# Patient Record
Sex: FEMALE | Race: BLACK OR AFRICAN AMERICAN | NOT HISPANIC OR LATINO | Employment: OTHER | URBAN - METROPOLITAN AREA
[De-identification: names, ages, dates, MRNs, and addresses within clinical notes are randomized per-mention and may not be internally consistent; named-entity substitution may affect disease eponyms.]

---

## 2019-10-11 PROBLEM — E11.9 DIABETES MELLITUS WITHOUT COMPLICATION: Status: ACTIVE | Noted: 2019-10-11

## 2019-10-11 PROBLEM — H25.819 COMBINED FORMS OF AGE-RELATED CATARACT: Status: ACTIVE | Noted: 2019-10-11

## 2019-10-16 PROBLEM — Z95.828 STATUS POST PLACEMENT OF ARTERIOVENOUS GRAFT: Status: ACTIVE | Noted: 2019-10-16

## 2019-10-16 PROBLEM — T82.9XXA COMPLICATIONS DUE TO RENAL DIALYSIS DEVICE, IMPLANT, AND GRAFT: Status: ACTIVE | Noted: 2019-10-16

## 2019-12-19 PROBLEM — T82.7XXA DIALYSIS AV FISTULA INFECTION: Status: ACTIVE | Noted: 2019-12-19

## 2019-12-23 PROBLEM — E11.9 DIABETES MELLITUS WITHOUT COMPLICATION: Status: RESOLVED | Noted: 2019-10-11 | Resolved: 2019-12-23

## 2020-01-27 PROBLEM — T14.8XXA WOUND INFECTION: Chronic | Status: ACTIVE | Noted: 2020-01-27

## 2020-01-27 PROBLEM — L08.9 WOUND INFECTION: Chronic | Status: ACTIVE | Noted: 2020-01-27

## 2020-01-27 PROBLEM — T82.7XXA ARTERIOVENOUS GRAFT INFECTION: Status: ACTIVE | Noted: 2020-01-27

## 2020-01-30 PROBLEM — L02.419 AXILLARY ABSCESS: Status: ACTIVE | Noted: 2020-01-30

## 2020-02-01 PROBLEM — T82.7XXA ARTERIOVENOUS GRAFT INFECTION: Status: ACTIVE | Noted: 2019-09-18

## 2020-02-14 PROBLEM — E87.20 LACTIC ACIDOSIS: Status: ACTIVE | Noted: 2020-02-14

## 2020-02-15 PROBLEM — A41.9 SEPSIS: Status: ACTIVE | Noted: 2020-01-27

## 2020-02-17 PROBLEM — J18.9 PNEUMONIA: Status: ACTIVE | Noted: 2020-02-17

## 2020-03-30 PROBLEM — L02.419 AXILLARY ABSCESS: Status: RESOLVED | Noted: 2020-01-30 | Resolved: 2020-03-30

## 2020-03-30 PROBLEM — K92.2 UGIB (UPPER GASTROINTESTINAL BLEED): Status: ACTIVE | Noted: 2020-03-30

## 2020-03-30 PROBLEM — K92.0 COFFEE GROUND EMESIS: Status: ACTIVE | Noted: 2020-03-30

## 2020-03-30 PROBLEM — J18.9 PNEUMONIA: Status: RESOLVED | Noted: 2020-02-17 | Resolved: 2020-03-30

## 2020-03-30 PROBLEM — T82.7XXA DIALYSIS AV FISTULA INFECTION: Status: RESOLVED | Noted: 2019-12-19 | Resolved: 2020-03-30

## 2020-03-30 PROBLEM — A41.9 SEPSIS: Status: RESOLVED | Noted: 2020-01-27 | Resolved: 2020-03-30

## 2020-03-30 PROBLEM — E87.20 LACTIC ACIDOSIS: Status: RESOLVED | Noted: 2020-02-14 | Resolved: 2020-03-30

## 2020-03-30 PROBLEM — T82.7XXA ARTERIOVENOUS GRAFT INFECTION: Status: RESOLVED | Noted: 2019-09-18 | Resolved: 2020-03-30

## 2020-05-07 PROBLEM — G45.9 TIA (TRANSIENT ISCHEMIC ATTACK): Status: ACTIVE | Noted: 2020-05-07

## 2020-05-07 PROBLEM — E11.9 T2DM (TYPE 2 DIABETES MELLITUS): Status: ACTIVE | Noted: 2020-05-07

## 2020-05-07 PROBLEM — N18.6 ESRD (END STAGE RENAL DISEASE): Status: ACTIVE | Noted: 2020-05-07

## 2020-05-07 PROBLEM — I63.9 CVA (CEREBRAL VASCULAR ACCIDENT): Status: ACTIVE | Noted: 2020-05-07

## 2020-05-07 PROBLEM — I10 ESSENTIAL HYPERTENSION: Status: ACTIVE | Noted: 2020-05-07

## 2020-05-07 PROBLEM — I50.9 CHF (CONGESTIVE HEART FAILURE): Status: ACTIVE | Noted: 2020-05-07

## 2020-05-08 PROBLEM — R55 POSTURAL DIZZINESS WITH PRESYNCOPE: Status: ACTIVE | Noted: 2020-05-08

## 2020-05-08 PROBLEM — R42 POSTURAL DIZZINESS WITH PRESYNCOPE: Status: ACTIVE | Noted: 2020-05-08

## 2020-05-09 PROBLEM — I95.1 ORTHOSTATIC HYPOTENSION: Status: ACTIVE | Noted: 2020-05-09

## 2020-05-09 PROBLEM — R42 POSTURAL DIZZINESS WITH PRESYNCOPE: Status: RESOLVED | Noted: 2020-05-08 | Resolved: 2020-05-09

## 2020-05-09 PROBLEM — R55 POSTURAL DIZZINESS WITH PRESYNCOPE: Status: RESOLVED | Noted: 2020-05-08 | Resolved: 2020-05-09

## 2020-05-19 PROBLEM — R11.2 INTRACTABLE NAUSEA AND VOMITING: Status: ACTIVE | Noted: 2020-05-19

## 2020-05-19 PROBLEM — T82.898A PROBLEM WITH DIALYSIS ACCESS: Status: ACTIVE | Noted: 2020-05-19

## 2020-05-19 PROBLEM — A49.8 CLOSTRIDIUM DIFFICILE INFECTION: Status: ACTIVE | Noted: 2020-05-19

## 2020-05-19 PROBLEM — R11.10 INTRACTABLE VOMITING: Status: ACTIVE | Noted: 2020-05-19

## 2020-05-21 PROBLEM — A49.8 CLOSTRIDIUM DIFFICILE INFECTION: Status: ACTIVE | Noted: 2020-05-21

## 2020-05-21 PROBLEM — A04.72 C. DIFFICILE DIARRHEA: Status: ACTIVE | Noted: 2020-05-21

## 2020-05-22 PROBLEM — R11.2 INTRACTABLE NAUSEA AND VOMITING: Status: RESOLVED | Noted: 2020-05-19 | Resolved: 2020-05-22

## 2020-05-26 PROBLEM — K21.00 GASTROESOPHAGEAL REFLUX DISEASE WITH ESOPHAGITIS: Status: ACTIVE | Noted: 2020-05-26

## 2020-05-26 PROBLEM — I95.9 HYPOTENSION: Status: ACTIVE | Noted: 2020-05-26

## 2020-05-26 PROBLEM — E11.9 TYPE 2 DIABETES MELLITUS, WITHOUT LONG-TERM CURRENT USE OF INSULIN: Status: ACTIVE | Noted: 2020-05-26

## 2020-05-26 PROBLEM — R55 SYNCOPE: Status: ACTIVE | Noted: 2020-05-26

## 2020-05-26 PROBLEM — R94.31 PROLONGED Q-T INTERVAL ON ECG: Status: ACTIVE | Noted: 2020-05-26

## 2020-05-27 PROBLEM — A49.8 CLOSTRIDIUM DIFFICILE INFECTION: Status: ACTIVE | Noted: 2020-05-27

## 2020-05-27 PROBLEM — I95.1 AUTONOMIC POSTURAL HYPOTENSION: Status: ACTIVE | Noted: 2020-05-26

## 2020-05-28 PROBLEM — K20.90 ESOPHAGITIS DETERMINED BY ENDOSCOPY: Status: ACTIVE | Noted: 2020-05-26

## 2020-06-10 ENCOUNTER — NURSE TRIAGE (OUTPATIENT)
Dept: ADMINISTRATIVE | Facility: CLINIC | Age: 70
End: 2020-06-10

## 2020-06-10 NOTE — TELEPHONE ENCOUNTER
Pt contacted through the Post Procedural Symptom Tracker. No answer. No additional contact today as per post procedure protocol.

## 2020-06-10 NOTE — TELEPHONE ENCOUNTER
Reason for Disposition   No answer.  First attempt to contact caller.  Follow-up call scheduled within 15 minutes.     Day 13: only one attempt per post proc protocol.    Additional Information   Negative: Caller has already spoken with the PCP (or office), and has no further questions   Negative: Caller has already spoken with another triager and has no further questions   Negative: Caller has already spoken with another triager or PCP (or office), and has further questions and triager able to answer questions.   Negative: Busy signal.  First attempt to contact caller.  Follow-up call scheduled within 15 minutes.    Protocols used: NO CONTACT OR DUPLICATE CONTACT CALL-A-[facility]

## 2020-06-11 NOTE — TELEPHONE ENCOUNTER
Pt contacted through the Post Procedural Symptom Tracker. No answer. No additional contact today as per post procedure protocol.  dy 14   [id number]      Reason for Disposition   Second attempt to contact family AND no contact made. Phone number verified.    Protocols used: NO CONTACT OR DUPLICATE CONTACT CALL-A-[facility]

## 2020-06-13 PROBLEM — K92.0 COFFEE GROUND EMESIS: Status: RESOLVED | Noted: 2020-03-30 | Resolved: 2020-06-13

## 2020-06-13 PROBLEM — R91.8 PULMONARY INFILTRATES ON CXR: Status: ACTIVE | Noted: 2020-06-13

## 2020-06-13 PROBLEM — I95.9 HYPOTENSION: Status: ACTIVE | Noted: 2020-06-13

## 2020-06-15 PROBLEM — R53.81 PHYSICAL DECONDITIONING: Status: ACTIVE | Noted: 2020-06-15

## 2020-06-15 PROBLEM — E46 PROTEIN CALORIE MALNUTRITION: Status: ACTIVE | Noted: 2020-06-15

## 2020-06-29 ENCOUNTER — NURSE TRIAGE (OUTPATIENT)
Dept: ADMINISTRATIVE | Facility: CLINIC | Age: 70
End: 2020-06-29

## 2020-06-29 NOTE — TELEPHONE ENCOUNTER
Patient is in rehab center. No post procedure call needed.     Reason for Disposition   Patient already left for the hospital/clinic    Protocols used: NO CONTACT OR DUPLICATE CONTACT CALL-A-[facility]

## 2020-07-16 PROBLEM — K20.80 ESOPHAGITIS, LOS ANGELES GRADE D: Status: ACTIVE | Noted: 2020-07-16

## 2020-07-16 PROBLEM — A49.8 CLOSTRIDIUM DIFFICILE INFECTION: Status: RESOLVED | Noted: 2020-05-27 | Resolved: 2020-07-16

## 2020-07-17 PROBLEM — K22.2 ESOPHAGEAL STRICTURE: Status: ACTIVE | Noted: 2020-07-17

## 2020-07-20 PROBLEM — K20.80 ESOPHAGITIS, LOS ANGELES GRADE D: Status: RESOLVED | Noted: 2020-07-16 | Resolved: 2020-07-20

## 2020-07-21 PROBLEM — Z93.1 G TUBE FEEDINGS: Status: ACTIVE | Noted: 2020-07-21

## 2020-07-29 ENCOUNTER — NURSE TRIAGE (OUTPATIENT)
Dept: ADMINISTRATIVE | Facility: CLINIC | Age: 70
End: 2020-07-29

## 2020-07-29 NOTE — TELEPHONE ENCOUNTER
Post procedure follow up call scheduled for today, no contact made. Patient is a resident at a nursing home. No need for further triage at this time.     Reason for Disposition   Caller has already spoken with the PCP (or office), and has no further questions    Protocols used: NO CONTACT OR DUPLICATE CONTACT CALL-A-[facility]

## 2020-10-08 PROBLEM — R41.82 ALTERED MENTAL STATUS: Status: ACTIVE | Noted: 2020-10-08

## 2020-10-08 PROBLEM — K21.9 GERD (GASTROESOPHAGEAL REFLUX DISEASE): Status: ACTIVE | Noted: 2020-10-08

## 2020-10-08 PROBLEM — F05 ACUTE CONFUSIONAL STATE: Status: ACTIVE | Noted: 2020-10-08

## 2020-10-08 PROBLEM — E11.9 DIABETES MELLITUS: Status: ACTIVE | Noted: 2020-10-08

## 2020-10-08 PROBLEM — E16.2 HYPOGLYCEMIA, UNSPECIFIED: Status: ACTIVE | Noted: 2020-10-08

## 2020-10-08 PROBLEM — I25.10 CAD (CORONARY ARTERY DISEASE): Status: ACTIVE | Noted: 2020-10-08

## 2020-10-08 PROBLEM — G93.40 ACUTE ENCEPHALOPATHY: Status: ACTIVE | Noted: 2020-10-08

## 2020-10-09 PROBLEM — R41.82 ALTERED MENTAL STATUS: Status: ACTIVE | Noted: 2020-10-09

## 2020-10-09 PROBLEM — R41.82 ALTERED MENTAL STATUS: Status: RESOLVED | Noted: 2020-10-08 | Resolved: 2020-10-09

## 2020-10-09 PROBLEM — G93.40 ACUTE ENCEPHALOPATHY: Status: RESOLVED | Noted: 2020-10-08 | Resolved: 2020-10-09

## 2020-10-09 PROBLEM — E16.2 HYPOGLYCEMIA: Status: RESOLVED | Noted: 2020-10-08 | Resolved: 2020-10-09

## 2020-10-09 PROBLEM — I10 ESSENTIAL HYPERTENSION: Status: RESOLVED | Noted: 2020-05-07 | Resolved: 2020-10-09

## 2020-10-09 PROBLEM — F05 ACUTE CONFUSIONAL STATE: Status: RESOLVED | Noted: 2020-10-08 | Resolved: 2020-10-09

## 2020-10-10 PROBLEM — E87.3 METABOLIC ALKALOSIS: Status: ACTIVE | Noted: 2020-10-10

## 2020-10-11 PROBLEM — R41.82 ALTERED MENTAL STATUS: Status: RESOLVED | Noted: 2020-10-09 | Resolved: 2020-10-11

## 2020-10-12 PROBLEM — T74.91XA ELDER ABUSE: Status: ACTIVE | Noted: 2020-10-12

## 2020-10-12 PROBLEM — F32.A DEPRESSION: Status: ACTIVE | Noted: 2020-10-12

## 2020-10-12 PROBLEM — F43.29 ADJUSTMENT DISORDER WITH EMOTIONAL DISTURBANCE: Status: ACTIVE | Noted: 2020-10-12

## 2020-10-12 PROBLEM — A04.72 C. DIFFICILE DIARRHEA: Status: RESOLVED | Noted: 2020-05-21 | Resolved: 2020-10-12

## 2020-10-15 PROBLEM — I50.9 HEART FAILURE: Status: RESOLVED | Noted: 2020-05-07 | Resolved: 2020-10-15

## 2021-04-11 PROBLEM — Z99.2 ESRD ON HEMODIALYSIS: Status: ACTIVE | Noted: 2020-05-07

## 2021-04-11 PROBLEM — Z91.89 AT RISK FOR ELDER ABUSE: Status: ACTIVE | Noted: 2021-01-01

## 2021-04-11 PROBLEM — E43 SEVERE MALNUTRITION: Status: ACTIVE | Noted: 2021-01-01

## 2021-04-11 PROBLEM — J90 PLEURAL EFFUSION: Status: ACTIVE | Noted: 2021-01-01

## 2021-04-11 PROBLEM — R13.10 DYSPHAGIA: Status: ACTIVE | Noted: 2021-01-01

## 2021-04-11 PROBLEM — R64 CACHEXIA: Status: ACTIVE | Noted: 2021-01-01

## 2021-04-12 PROBLEM — T82.7XXA INFECTION OF AV GRAFT FOR DIALYSIS: Chronic | Status: ACTIVE | Noted: 2021-01-01

## 2021-04-13 PROBLEM — I95.9 HYPOTENSION: Status: ACTIVE | Noted: 2021-01-01

## 2021-04-17 PROBLEM — R94.39 ABNORMAL CARDIOVASCULAR STRESS TEST: Status: ACTIVE | Noted: 2021-01-01

## 2021-04-23 PROBLEM — Z91.89 AT RISK FOR ELDER ABUSE: Status: RESOLVED | Noted: 2021-01-01 | Resolved: 2021-01-01

## 2021-04-23 PROBLEM — T82.7XXA: Status: ACTIVE | Noted: 2021-01-01

## 2021-04-23 PROBLEM — R94.39 ABNORMAL CARDIOVASCULAR STRESS TEST: Status: RESOLVED | Noted: 2021-01-01 | Resolved: 2021-01-01

## 2021-04-29 PROBLEM — D63.1 ANEMIA DUE TO CHRONIC KIDNEY DISEASE, ON CHRONIC DIALYSIS: Status: ACTIVE | Noted: 2021-01-01

## 2021-04-29 PROBLEM — N18.6 ANEMIA DUE TO CHRONIC KIDNEY DISEASE, ON CHRONIC DIALYSIS: Status: ACTIVE | Noted: 2021-01-01

## 2021-04-29 PROBLEM — Z99.2 ANEMIA DUE TO CHRONIC KIDNEY DISEASE, ON CHRONIC DIALYSIS: Status: ACTIVE | Noted: 2021-01-01

## 2021-05-03 PROBLEM — T82.7XXA: Status: RESOLVED | Noted: 2021-01-01 | Resolved: 2021-01-01

## 2021-05-03 PROBLEM — I95.9 HYPOTENSION: Status: RESOLVED | Noted: 2021-01-01 | Resolved: 2021-01-01

## 2021-05-03 PROBLEM — E16.2 HYPOGLYCEMIA: Status: RESOLVED | Noted: 2020-10-08 | Resolved: 2021-01-01

## 2021-05-03 PROBLEM — J90 PLEURAL EFFUSION: Status: RESOLVED | Noted: 2021-01-01 | Resolved: 2021-01-01

## 2021-06-29 PROBLEM — R06.02 SHORTNESS OF BREATH: Status: ACTIVE | Noted: 2021-01-01

## 2021-06-29 PROBLEM — E87.5 HYPERKALEMIA: Status: ACTIVE | Noted: 2021-01-01

## 2021-06-29 PROBLEM — I21.4 NSTEMI (NON-ST ELEVATED MYOCARDIAL INFARCTION): Status: ACTIVE | Noted: 2021-01-01

## 2021-06-30 PROBLEM — D69.6 THROMBOCYTOPENIA: Status: ACTIVE | Noted: 2021-01-01

## 2021-08-28 PROBLEM — K94.23 PEG TUBE MALFUNCTION: Status: ACTIVE | Noted: 2021-01-01

## 2021-08-28 PROBLEM — I95.9 HYPOTENSION: Status: ACTIVE | Noted: 2021-01-01

## 2021-08-28 PROBLEM — I16.0 HYPERTENSIVE URGENCY: Status: ACTIVE | Noted: 2021-01-01

## 2021-08-28 PROBLEM — I95.9 HYPOTENSION: Status: RESOLVED | Noted: 2021-01-01 | Resolved: 2021-01-01

## 2021-08-28 PROBLEM — E87.6 HYPOKALEMIA: Status: ACTIVE | Noted: 2021-01-01

## 2021-08-28 PROBLEM — J69.0 ASPIRATION PNEUMONIA: Status: ACTIVE | Noted: 2021-01-01

## 2021-08-28 PROBLEM — I10 ESSENTIAL HYPERTENSION: Status: ACTIVE | Noted: 2021-01-01

## 2021-08-29 PROBLEM — I16.0 HYPERTENSIVE URGENCY: Status: RESOLVED | Noted: 2021-01-01 | Resolved: 2021-01-01

## 2021-08-29 PROBLEM — J69.0 ASPIRATION PNEUMONIA: Status: RESOLVED | Noted: 2021-01-01 | Resolved: 2021-01-01

## 2021-08-30 PROBLEM — E87.6 HYPOKALEMIA: Status: RESOLVED | Noted: 2021-01-01 | Resolved: 2021-01-01

## 2021-08-30 PROBLEM — K94.23 PEG TUBE MALFUNCTION: Status: RESOLVED | Noted: 2021-01-01 | Resolved: 2021-01-01

## 2021-08-30 PROBLEM — R91.1 PULMONARY NODULE: Status: ACTIVE | Noted: 2021-01-01

## 2021-08-30 PROBLEM — R05.8 COUGH PRODUCTIVE OF CLEAR SPUTUM: Status: ACTIVE | Noted: 2021-01-01
